# Patient Record
Sex: FEMALE | Race: WHITE | NOT HISPANIC OR LATINO
[De-identification: names, ages, dates, MRNs, and addresses within clinical notes are randomized per-mention and may not be internally consistent; named-entity substitution may affect disease eponyms.]

---

## 2022-04-07 ENCOUNTER — APPOINTMENT (OUTPATIENT)
Dept: ORTHOPEDIC SURGERY | Facility: CLINIC | Age: 47
End: 2022-04-07
Payer: COMMERCIAL

## 2022-04-07 VITALS — WEIGHT: 170 LBS | HEIGHT: 64 IN | BODY MASS INDEX: 29.02 KG/M2

## 2022-04-07 DIAGNOSIS — M25.511 PAIN IN RIGHT SHOULDER: ICD-10-CM

## 2022-04-07 DIAGNOSIS — M19.011 PRIMARY OSTEOARTHRITIS, RIGHT SHOULDER: ICD-10-CM

## 2022-04-07 DIAGNOSIS — Z86.79 PERSONAL HISTORY OF OTHER DISEASES OF THE CIRCULATORY SYSTEM: ICD-10-CM

## 2022-04-07 DIAGNOSIS — M75.51 BURSITIS OF RIGHT SHOULDER: ICD-10-CM

## 2022-04-07 PROBLEM — Z00.00 ENCOUNTER FOR PREVENTIVE HEALTH EXAMINATION: Status: ACTIVE | Noted: 2022-04-07

## 2022-04-07 PROCEDURE — 99213 OFFICE O/P EST LOW 20 MIN: CPT

## 2022-04-07 NOTE — PHYSICAL EXAM
[Right] : right shoulder [Sitting] : sitting [4 ___] : forward flexion 4[unfilled]/5 [4___] : internal rotation 4[unfilled]/5 [] : motor and sensory intact distally [TWNoteComboBox7] : active forward flexion 140 degrees [de-identified] : active abduction 85 degrees [TWNoteComboBox6] : internal rotation L5 [de-identified] : external rotation 85 degrees

## 2022-04-07 NOTE — HISTORY OF PRESENT ILLNESS
[Steroid] : Steroid [de-identified] : Injury Details: The location of the patients problem is right shoulder. The date of injury was 11/4/21 Details of accident / injury: patient had an injury while jet skiing she states she fell off the back of a jetski. Complaining of constant pain in the shoulder. Describes the pain as dull and aching she states the pain radiates down the arm. . The injury was sudden. On a scale of 0-10, the pain when active is 4. On a scale of 0-10, the pain when at rest is 4. The quality of the pain is: Dull/Aching The patient has not had surgery for this problem in the past. The patient has not had physical therapy for this problem in the past.. The patient has not had any test or imaging studies for this problem. [] : no [de-identified] : 03/10/2022 [de-identified] : Dr Samayoa [de-identified] : 03/10/2022 [de-identified] : xray  [de-identified] : cortisone injection  [de-identified] : 03/10/2022

## 2022-04-07 NOTE — DISCUSSION/SUMMARY
[de-identified] : reviewed findings, anatomy and pathology  discussed and pt understands. questions answered, pt left pleased\par  discuss apply ice to area -- 20 min on and 20 min off  continue home exercises\par After discussion of Dx & Treatment options was discuss , pt elected to Tx non operatively with exercise , medications, physical therapy and activity modification.\par

## 2022-05-26 ENCOUNTER — APPOINTMENT (OUTPATIENT)
Dept: ORTHOPEDIC SURGERY | Facility: CLINIC | Age: 47
End: 2022-05-26

## 2022-09-29 ENCOUNTER — NON-APPOINTMENT (OUTPATIENT)
Age: 47
End: 2022-09-29

## 2022-10-23 ENCOUNTER — NON-APPOINTMENT (OUTPATIENT)
Age: 47
End: 2022-10-23

## 2023-03-24 ENCOUNTER — NON-APPOINTMENT (OUTPATIENT)
Age: 48
End: 2023-03-24

## 2023-03-24 ENCOUNTER — APPOINTMENT (OUTPATIENT)
Dept: CARDIOLOGY | Facility: CLINIC | Age: 48
End: 2023-03-24
Payer: COMMERCIAL

## 2023-03-24 VITALS
DIASTOLIC BLOOD PRESSURE: 90 MMHG | WEIGHT: 186 LBS | BODY MASS INDEX: 31.76 KG/M2 | HEART RATE: 88 BPM | OXYGEN SATURATION: 99 % | SYSTOLIC BLOOD PRESSURE: 136 MMHG | HEIGHT: 64 IN

## 2023-03-24 VITALS — DIASTOLIC BLOOD PRESSURE: 90 MMHG | SYSTOLIC BLOOD PRESSURE: 136 MMHG

## 2023-03-24 DIAGNOSIS — Z78.9 OTHER SPECIFIED HEALTH STATUS: ICD-10-CM

## 2023-03-24 DIAGNOSIS — Z82.49 FAMILY HISTORY OF ISCHEMIC HEART DISEASE AND OTHER DISEASES OF THE CIRCULATORY SYSTEM: ICD-10-CM

## 2023-03-24 PROCEDURE — 99204 OFFICE O/P NEW MOD 45 MIN: CPT | Mod: 25

## 2023-03-24 PROCEDURE — 93000 ELECTROCARDIOGRAM COMPLETE: CPT

## 2023-03-24 RX ORDER — ROSUVASTATIN CALCIUM 20 MG/1
20 TABLET, FILM COATED ORAL
Refills: 0 | Status: ACTIVE | COMMUNITY

## 2023-03-24 RX ORDER — LISINOPRIL 10 MG/1
10 TABLET ORAL
Refills: 0 | Status: DISCONTINUED | COMMUNITY
End: 2023-03-24

## 2023-03-24 RX ORDER — LISINOPRIL 20 MG/1
20 TABLET ORAL DAILY
Qty: 90 | Refills: 3 | Status: ACTIVE | COMMUNITY
Start: 2023-03-24

## 2023-03-24 NOTE — CARDIOLOGY SUMMARY
[de-identified] : March 24, 2023.  Normal sinus rhythm low voltage left atrial abnormality poor R wave progression

## 2023-03-24 NOTE — PHYSICAL EXAM
[Well Developed] : well developed [Well Nourished] : well nourished [No Acute Distress] : no acute distress [Normal Venous Pressure] : normal venous pressure [No Carotid Bruit] : no carotid bruit [Normal S1, S2] : normal S1, S2 [No Murmur] : no murmur [No Rub] : no rub [No Gallop] : no gallop [Clear Lung Fields] : clear lung fields [Good Air Entry] : good air entry [No Respiratory Distress] : no respiratory distress  [Normal Gait] : normal gait [No Edema] : no edema [No Cyanosis] : no cyanosis [No Clubbing] : no clubbing [No Varicosities] : no varicosities [Normal Radial B/L] : normal radial B/L [Normal DP B/L] : normal DP B/L [Moves all extremities] : moves all extremities [Normal Speech] : normal speech [Alert and Oriented] : alert and oriented

## 2023-03-24 NOTE — DISCUSSION/SUMMARY
[FreeTextEntry1] : 47-year-old with above medical history active medical problems as noted below\par 1.  Abnormal EKG.  Nonspecific.  Poor R wave progression low voltage.  Rule out silent myocardial injury with echocardiogram.  Also evaluate for LV ejection fraction wall motion LV wall thickness.\par 2.  Essential hypertension.  Mildly uncontrolled.  Goal less than 130/80.  There is no CHF.  No CKD.  She is non-smoker.\par Low caffeine and alcohol intake reviewed\par Low-salt diet low-carb diet, regular aerobic exercises and weight reduction.\par Add hydrochlorothiazide to lisinopril 20 mg.  Risk benefits alternatives side effects reviewed with her.  Recheck basic metabolic panel in about 2 weeks\par Any side effects she will contact us\par As long as no significant side effects and blood pressure is better control combine lisinopril/hydrochlorothiazide to minimize the number of pills taken\par 3.  Dyslipidemia.  On statin therapy.  For further restratification if echocardiogram and exercise treadmill stress test does not reveal any significant structural heart disease or obstructive CAD consider coronary calcium score or coronary CTA.\par 4.  Overweight borderline for obesity.  Lifestyle modification discussed with weight reduction for long-term cardiovascular benefits\par \par Counseling regarding low saturated fat, salt and carbohydrate intake was reviewed. Active lifestyle and regular. Exercise along with weight management is advised.\par All the above were at length reviewed. Answered all the questions. Thank you very much for this kind referral. Please do not hesitate to give me a call for any question.\par Part of this transcription was done with voice recognition software and phonetically similar errors are common. I apologize for that. Please do not hesitate to call for any questions due to above.\par \par Sincerely,\par Mariangel Live MD,FACC,FASELIJAH\par

## 2023-03-24 NOTE — REASON FOR VISIT
[Hyperlipidemia] : hyperlipidemia [Hypertension] : hypertension [FreeTextEntry3] : Dr. Falcon [FreeTextEntry1] : 47-year-old white female is referred to me for consultation in presence of\par Essential hypertension without any history of CHF CKD.  Non-smoker.  Blood pressure at home remaining in 130s and 90s.\par Dyslipidemia on statin therapy tolerating it well.\par \par No history of myocardial infarction, coronary artery disease, cerebrovascular accident, peripheral artery disease, rheumatic fever, thyroid or Lyme disease\par \par At baseline no significant chest pain.  Exertional dyspnea without any PND orthopnea.  No palpitation dizziness near syncopal or syncopal event.  No visual disturbances focal weakness.  No nausea vomiting diarrhea dark-colored stool change in bowel habits.\par No claudication pain\par No hospital admission from cardiac point of view\par No snoring.  No daytime fatigue tiredness or sleepiness no early morning headache.

## 2023-03-24 NOTE — ASSESSMENT
[FreeTextEntry1] : Reviewed on March 24, 2023.\par EKG as noted above\par Labs from 8/2/2022 normal CBC.  Sodium 135 potassium 4.4 creatinine 0.48 hemoglobin A1c 5.2 HDL 67 triglycerides 95 LDL 90 total cholesterol 176 TSH 1.74

## 2023-04-29 ENCOUNTER — NON-APPOINTMENT (OUTPATIENT)
Age: 48
End: 2023-04-29

## 2023-05-01 NOTE — PHYSICAL EXAM
[Well Developed] : well developed [Well Nourished] : well nourished [No Acute Distress] : no acute distress [Normal Venous Pressure] : normal venous pressure [No Carotid Bruit] : no carotid bruit [Normal S1, S2] : normal S1, S2 [No Murmur] : no murmur [No Gallop] : no gallop [No Rub] : no rub [Clear Lung Fields] : clear lung fields [Good Air Entry] : good air entry [No Respiratory Distress] : no respiratory distress  [Normal Gait] : normal gait [No Edema] : no edema [No Cyanosis] : no cyanosis [No Clubbing] : no clubbing [No Varicosities] : no varicosities [Normal Radial B/L] : normal radial B/L [Normal DP B/L] : normal DP B/L [Moves all extremities] : moves all extremities [Normal Speech] : normal speech [Alert and Oriented] : alert and oriented

## 2023-05-02 ENCOUNTER — APPOINTMENT (OUTPATIENT)
Dept: CARDIOLOGY | Facility: CLINIC | Age: 48
End: 2023-05-02
Payer: COMMERCIAL

## 2023-05-02 VITALS
SYSTOLIC BLOOD PRESSURE: 138 MMHG | WEIGHT: 186 LBS | BODY MASS INDEX: 31.76 KG/M2 | HEART RATE: 92 BPM | DIASTOLIC BLOOD PRESSURE: 80 MMHG | OXYGEN SATURATION: 95 % | HEIGHT: 64 IN

## 2023-05-02 DIAGNOSIS — Z87.891 PERSONAL HISTORY OF NICOTINE DEPENDENCE: ICD-10-CM

## 2023-05-02 DIAGNOSIS — Z13.6 ENCOUNTER FOR SCREENING FOR CARDIOVASCULAR DISORDERS: ICD-10-CM

## 2023-05-02 PROCEDURE — 93015 CV STRESS TEST SUPVJ I&R: CPT

## 2023-05-02 PROCEDURE — 93306 TTE W/DOPPLER COMPLETE: CPT

## 2023-05-02 PROCEDURE — 99214 OFFICE O/P EST MOD 30 MIN: CPT

## 2023-05-02 NOTE — REASON FOR VISIT
[Hyperlipidemia] : hyperlipidemia [Hypertension] : hypertension [FreeTextEntry3] : Dr. Falcon [FreeTextEntry1] : 47-year-old white female is referred to me for consultation in presence of\par Essential hypertension without any history of CHF CKD.  Non-smoker.  Blood pressure at home remaining in 130s and 90s.\par Dyslipidemia on statin therapy tolerating it well.\par \par No history of myocardial infarction, coronary artery disease, cerebrovascular accident, peripheral artery disease, rheumatic fever, thyroid or Lyme disease\par \par At baseline no significant chest pain.  Exertional dyspnea without any PND orthopnea.  No palpitation dizziness near syncopal or syncopal event.  No visual disturbances focal weakness.  No nausea vomiting diarrhea dark-colored stool change in bowel habits.\par No claudication pain\par No hospital admission from cardiac point of view\par No snoring.  No daytime fatigue tiredness or sleepiness no early morning headache.\par \par Reviewed her echocardiogram and exercise treadmill stress test.\par She is tolerating her hydrochlorothiazide well.\par Did not get the labs

## 2023-05-02 NOTE — DISCUSSION/SUMMARY
[FreeTextEntry1] : 47-year-old with above medical history active medical problems as noted below\par 1.  Abnormal EKG.  Nonspecific.  Poor R wave progression low voltage.  Preserved EF no significant wall motion abnormality.  No significant signs of scarring.\par 2.  Essential hypertension.  Much better controlled.  Still borderline..  Goal less than 130/80.  There is no CHF.  No CKD.  She is non-smoker.\par Low caffeine and alcohol intake reviewed\par Low-salt diet low-carb diet, regular aerobic exercises and weight reduction.\par Continue with lisinopril 20 mg hydrochlorothiazide 12.5 mg\par Risk benefits alternatives side effects reviewed with her.  Recheck basic metabolic panel prescription given.\par Lifestyle modifications reviewed.\par In 3 months if blood pressure remains greater than 130/80 would recommend to change lisinopril to angiotensin receptor blocker.\par 3.  Dyslipidemia.  On statin therapy.  Reviewed with her regarding further restratification with coronary calcium score.  Risk benefits alternatives of radiation/CT scan discussed with her.  She understands and agrees.\par She also understands out-of-pocket cost.\par 4.  Overweight borderline for obesity.  Lifestyle modification discussed with weight reduction for long-term cardiovascular benefits\par \par Counseling regarding low saturated fat, salt and carbohydrate intake was reviewed. Active lifestyle and regular. Exercise along with weight management is advised.\par All the above were at length reviewed. Answered all the questions. Thank you very much for this kind referral. Please do not hesitate to give me a call for any question.\par Part of this transcription was done with voice recognition software and phonetically similar errors are common. I apologize for that. Please do not hesitate to call for any questions due to above.\par \par Sincerely,\par Mariangel Live MD,FACC,MELCHOR\par

## 2023-05-02 NOTE — CARDIOLOGY SUMMARY
[de-identified] : March 24, 2023.  Normal sinus rhythm low voltage left atrial abnormality poor R wave progression [de-identified] : Exercise treadmill stress test.  May 2, 2023.  Nonischemic.  8 METS [de-identified] : Echocardiogram.  May 2, 2023.  Preserved EF.

## 2023-05-16 ENCOUNTER — TRANSCRIPTION ENCOUNTER (OUTPATIENT)
Age: 48
End: 2023-05-16

## 2023-05-18 ENCOUNTER — NON-APPOINTMENT (OUTPATIENT)
Age: 48
End: 2023-05-18

## 2023-05-22 ENCOUNTER — NON-APPOINTMENT (OUTPATIENT)
Age: 48
End: 2023-05-22

## 2023-08-15 ENCOUNTER — APPOINTMENT (OUTPATIENT)
Dept: CARDIOLOGY | Facility: CLINIC | Age: 48
End: 2023-08-15
Payer: COMMERCIAL

## 2023-08-15 VITALS
HEART RATE: 82 BPM | BODY MASS INDEX: 31.41 KG/M2 | WEIGHT: 184 LBS | SYSTOLIC BLOOD PRESSURE: 128 MMHG | HEIGHT: 64 IN | DIASTOLIC BLOOD PRESSURE: 72 MMHG | OXYGEN SATURATION: 96 %

## 2023-08-15 DIAGNOSIS — I10 ESSENTIAL (PRIMARY) HYPERTENSION: ICD-10-CM

## 2023-08-15 DIAGNOSIS — E78.5 HYPERLIPIDEMIA, UNSPECIFIED: ICD-10-CM

## 2023-08-15 DIAGNOSIS — R94.31 ABNORMAL ELECTROCARDIOGRAM [ECG] [EKG]: ICD-10-CM

## 2023-08-15 DIAGNOSIS — E66.3 OVERWEIGHT: ICD-10-CM

## 2023-08-15 PROCEDURE — 99214 OFFICE O/P EST MOD 30 MIN: CPT

## 2023-08-15 NOTE — ASSESSMENT
[FreeTextEntry1] : Reviewed on March 24, 2023. EKG as noted above Labs from 8/2/2022 normal CBC.  Sodium 135 potassium 4.4 creatinine 0.48 hemoglobin A1c 5.2 HDL 67 triglycerides 95 LDL 90 total cholesterol 176 TSH 1.74   reviewed on August 15, 2023 Coronary calcium score reviewed.

## 2023-08-15 NOTE — CARDIOLOGY SUMMARY
[de-identified] : March 24, 2023.  Normal sinus rhythm low voltage left atrial abnormality poor R wave progression [de-identified] : Exercise treadmill stress test.  May 2, 2023.  Nonischemic.  8 METS [de-identified] : Echocardiogram.  May 2, 2023.  Preserved EF. [de-identified] : Coronary calcium score.  May 2023.  Calcium score 0.

## 2023-08-15 NOTE — DISCUSSION/SUMMARY
[FreeTextEntry1] : 47-year-old with above medical history active medical problems as noted below 1.  Abnormal EKG.  Nonspecific.  Poor R wave progression low voltage.  Preserved EF no significant wall motion abnormality.  No significant signs of scarring.  Nonischemic stress test.  Coronary calcium score is 0.  Limitation of test reviewed Continue lifestyle and risk factor modifications reviewed. 2.  Essential hypertension.  Much better controlled.    Goal less than 130/80.  There is no CHF.  No CKD.  She is non-smoker. Low caffeine and alcohol intake reviewed Low-salt diet low-carb diet, regular aerobic exercises and weight reduction. Continue with lisinopril 20 mg hydrochlorothiazide 12.5 mg Risk benefits alternatives side effects reviewed with her.  Recheck basic metabolic panel prescription given. Lifestyle modifications reviewed. 3.  Dyslipidemia.  On statin therapy.  Reviewed with her regarding further restratification with coronary calcium score.  Risk benefits alternatives of radiation/CT scan discussed with her.  She understands and agrees. 4.  Overweight borderline for obesity.  Lifestyle modification discussed with weight reduction for long-term cardiovascular benefits  Counseling regarding low saturated fat, salt and carbohydrate intake was reviewed. Active lifestyle and regular. Exercise along with weight management is advised. All the above were at length reviewed. Answered all the questions. Thank you very much for this kind referral. Please do not hesitate to give me a call for any question. Part of this transcription was done with voice recognition software and phonetically similar errors are common. I apologize for that. Please do not hesitate to call for any questions due to above.  Sincerely, Mariangel Live MD,[facility],MELCHOR

## 2023-08-15 NOTE — REASON FOR VISIT
[Hyperlipidemia] : hyperlipidemia [Hypertension] : hypertension [FreeTextEntry3] : Dr. Falcon [FreeTextEntry1] : 47-year-old was seen in the office for follow-up consultation to review her cardiovascular test specifically coronary calcium score, echocardiogram, stress test. Essential hypertension without any history of CHF CKD.  Non-smoker.  Blood pressure at home remaining in 130s and 90s. Dyslipidemia on statin therapy tolerating it well.  No history of myocardial infarction, coronary artery disease, cerebrovascular accident, peripheral artery disease, rheumatic fever, thyroid or Lyme disease  At baseline no significant chest pain.  Exertional dyspnea without any PND orthopnea.  No palpitation dizziness near syncopal or syncopal event.  No visual disturbances focal weakness.  No nausea vomiting diarrhea dark-colored stool change in bowel habits. No claudication pain No hospital admission from cardiac point of view No snoring.  No daytime fatigue tiredness or sleepiness no early morning headache.

## 2023-09-20 ENCOUNTER — RX RENEWAL (OUTPATIENT)
Age: 48
End: 2023-09-20

## 2023-10-29 ENCOUNTER — NON-APPOINTMENT (OUTPATIENT)
Age: 48
End: 2023-10-29

## 2023-12-20 ENCOUNTER — RX RENEWAL (OUTPATIENT)
Age: 48
End: 2023-12-20

## 2024-03-21 RX ORDER — HYDROCHLOROTHIAZIDE 12.5 MG/1
12.5 TABLET ORAL
Qty: 90 | Refills: 3 | Status: ACTIVE | COMMUNITY
Start: 2023-03-24 | End: 1900-01-01

## 2024-08-19 ENCOUNTER — APPOINTMENT (OUTPATIENT)
Dept: ORTHOPEDIC SURGERY | Facility: CLINIC | Age: 49
End: 2024-08-19

## 2024-08-19 DIAGNOSIS — M25.511 PAIN IN RIGHT SHOULDER: ICD-10-CM

## 2024-08-19 DIAGNOSIS — M75.51 BURSITIS OF RIGHT SHOULDER: ICD-10-CM

## 2024-08-19 DIAGNOSIS — M19.019 PRIMARY OSTEOARTHRITIS, UNSPECIFIED SHOULDER: ICD-10-CM

## 2024-08-19 PROCEDURE — 99213 OFFICE O/P EST LOW 20 MIN: CPT | Mod: 1L

## 2024-08-19 PROCEDURE — 20610 DRAIN/INJ JOINT/BURSA W/O US: CPT | Mod: RT

## 2024-08-19 PROCEDURE — 73030 X-RAY EXAM OF SHOULDER: CPT | Mod: RT

## 2024-08-19 NOTE — HISTORY OF PRESENT ILLNESS
[de-identified] : Patient states that she had an injury to her RT shoulder on 8/17/24. Patient was treated for bursitis in 2022 and was fine until that new injury. Patient states that it feels inflamed. Patient has been resting her arm. Patient is taking Tylenol prn.

## 2024-08-19 NOTE — PHYSICAL EXAM
[Sitting] : sitting [Moderate] : moderate [4 ___] : forward flexion 4[unfilled]/5 [4___] : internal rotation 4[unfilled]/5 [] : no erythema [Right] : right shoulder [There are no fractures, subluxations or dislocations. No significant abnormalities are seen] : There are no fractures, subluxations or dislocations. No significant abnormalities are seen [Degenerative change] : Degenerative change [AC Joint Arthrosis] : AC Joint Arthrosis [TWNoteComboBox7] : active forward flexion 90 degrees [de-identified] : active abduction 70 degrees [TWNoteComboBox6] : internal rotation sacrum [de-identified] : external rotation 70 degrees

## 2024-08-19 NOTE — PROCEDURE
[Large Joint Injection] : Large joint injection [Right] : of the right [Shoulder] : shoulder [Pain] : pain [Inflammation] : inflammation [Alcohol] : alcohol [Betadine] : betadine [Ethyl Chloride sprayed topically] : ethyl chloride sprayed topically [Sterile technique used] : sterile technique used [___ cc    6mg] :  Betamethasone (Celestone) ~Vcc of 6mg [___ cc    1%] : Lidocaine ~Vcc of 1%  [] : Patient tolerated procedure well [Call if redness, pain or fever occur] : call if redness, pain or fever occur [Apply ice for 15min out of every hour for the next 12-24 hours as tolerated] : apply ice for 15 minutes out of every hour for the next 12-24 hours as tolerated [Patient was advised to rest the joint(s) for ____ days] : patient was advised to rest the joint(s) for [unfilled] days [Previous OTC use and PT nontherapeutic] : patient has tried OTC's including aspirin, Ibuprofen, Aleve, etc or prescription NSAIDS, and/or exercises at home and/or physical therapy without satisfactory response [Patient had decreased mobility in the joint] : patient had decreased mobility in the joint [Risks, benefits, alternatives discussed / Verbal consent obtained] : the risks benefits, and alternatives have been discussed, and verbal consent was obtained

## 2024-08-19 NOTE — DISCUSSION/SUMMARY
[de-identified] : I reviewed all diagnostic and physical findings, the anatomy and pathology were discussed and the patient understands. All questions were answered and the patient left pleased. After discussion of diagnosis and treatment options, this patient has elected to treat non-operatively with exercises, medications, physical therapy and activity modification.

## 2024-09-03 ENCOUNTER — APPOINTMENT (OUTPATIENT)
Dept: ORTHOPEDIC SURGERY | Facility: CLINIC | Age: 49
End: 2024-09-03

## 2024-09-24 ENCOUNTER — NON-APPOINTMENT (OUTPATIENT)
Age: 49
End: 2024-09-24

## 2024-09-24 ENCOUNTER — APPOINTMENT (OUTPATIENT)
Dept: CARDIOLOGY | Facility: CLINIC | Age: 49
End: 2024-09-24
Payer: COMMERCIAL

## 2024-09-24 VITALS
HEIGHT: 64 IN | OXYGEN SATURATION: 96 % | WEIGHT: 188 LBS | SYSTOLIC BLOOD PRESSURE: 120 MMHG | DIASTOLIC BLOOD PRESSURE: 70 MMHG | BODY MASS INDEX: 32.1 KG/M2 | HEART RATE: 97 BPM

## 2024-09-24 DIAGNOSIS — R94.31 ABNORMAL ELECTROCARDIOGRAM [ECG] [EKG]: ICD-10-CM

## 2024-09-24 DIAGNOSIS — Z13.6 ENCOUNTER FOR SCREENING FOR CARDIOVASCULAR DISORDERS: ICD-10-CM

## 2024-09-24 DIAGNOSIS — I10 ESSENTIAL (PRIMARY) HYPERTENSION: ICD-10-CM

## 2024-09-24 DIAGNOSIS — E78.5 HYPERLIPIDEMIA, UNSPECIFIED: ICD-10-CM

## 2024-09-24 PROCEDURE — 93000 ELECTROCARDIOGRAM COMPLETE: CPT

## 2024-09-24 PROCEDURE — G2211 COMPLEX E/M VISIT ADD ON: CPT | Mod: NC

## 2024-09-24 PROCEDURE — 99214 OFFICE O/P EST MOD 30 MIN: CPT

## 2024-09-24 NOTE — REASON FOR VISIT
[Hyperlipidemia] : hyperlipidemia [Hypertension] : hypertension [FreeTextEntry3] : Dr. Falcon [FreeTextEntry1] : 48-year-old was seen in the office for follow-up consultation for management of Essential hypertension without any history of CHF CKD.  Non-smoker.  Blood pressure at home remaining in 130s and 90s. Dyslipidemia on statin therapy tolerating it well.  No history of myocardial infarction, coronary artery disease, cerebrovascular accident, peripheral artery disease, rheumatic fever, thyroid or Lyme disease  At baseline no significant chest pain.  Exertional dyspnea without any PND orthopnea.  No palpitation dizziness near syncopal or syncopal event.  No visual disturbances focal weakness.  No nausea vomiting diarrhea dark-colored stool change in bowel habits. She has been using her [employer] bike 15 to 20 miles a day. No claudication pain No hospital admission from cardiac point of view No snoring.  No daytime fatigue tiredness or sleepiness no early morning headache.

## 2024-09-24 NOTE — ASSESSMENT
[FreeTextEntry1] : Reviewed on March 24, 2023. EKG as noted above Labs from 8/2/2022 normal CBC.  Sodium 135 potassium 4.4 creatinine 0.48 hemoglobin A1c 5.2 HDL 67 triglycerides 95 LDL 90 total cholesterol 176 TSH 1.74   reviewed on August 15, 2023 Coronary calcium score reviewed.  Reviewed on September 24, 2024.  EKG from today reviewed.  Labs done recently are requested.

## 2024-09-24 NOTE — CARDIOLOGY SUMMARY
[de-identified] : March 24, 2023.  Normal sinus rhythm low voltage left atrial abnormality poor R wave progression September 24, 2024.  Normal sinus rhythm.  Left atrial abnormality.  Poor R wave progression. [de-identified] : Exercise treadmill stress test.  May 2, 2023.  Nonischemic.  8 METS [de-identified] : Echocardiogram.  May 2, 2023.  Preserved EF. [de-identified] : Coronary calcium score.  May 2023.  Calcium score 0.

## 2024-09-24 NOTE — DISCUSSION/SUMMARY
[FreeTextEntry1] : 48-year-old with above medical history active medical problems as noted below 1.  Abnormal EKG.  Nonspecific.  Poor R wave progression low voltage.  Preserved EF no significant wall motion abnormality.  No significant signs of scarring.  Nonischemic stress test.  Coronary calcium score is 0.  Limitation of test reviewed Continue lifestyle and risk factor modifications reviewed. 2.  Essential hypertension.  Much better controlled.    Goal less than 130/80.  There is no CHF.  No CKD.  She is non-smoker. Low caffeine and alcohol intake reviewed Low-salt diet low-carb diet, regular aerobic exercises and weight reduction. Continue with lisinopril 20 mg hydrochlorothiazide 12.5 mg Risk benefits alternatives side effects reviewed with her.  Recheck basic metabolic panel prescription given. Lifestyle modifications reviewed. 3.  Dyslipidemia.  On statin therapy.  Reviewed with her regarding further restratification with coronary calcium score.  Risk benefits alternatives of radiation/CT scan discussed with her.  She understands and agrees. 4.  Overweight borderline for obesity.  Lifestyle modification discussed with weight reduction for long-term cardiovascular benefits  Counseling regarding low saturated fat, salt and carbohydrate intake was reviewed. Active lifestyle and regular. Exercise along with weight management is advised. All the above were at length reviewed. Answered all the questions. Thank you very much for this kind referral. Please do not hesitate to give me a call for any question. Part of this transcription was done with voice recognition software and phonetically similar errors are common. I apologize for that. Please do not hesitate to call for any questions due to above.  Sincerely, Mariangel Live MD,[facility],MELCHOR  [EKG obtained to assist in diagnosis and management of assessed problem(s)] : EKG obtained to assist in diagnosis and management of assessed problem(s)

## 2024-11-13 NOTE — PHYSICAL EXAM
[Well Developed] : well developed [No Acute Distress] : no acute distress [Normal Venous Pressure] : normal venous pressure [No Carotid Bruit] : no carotid bruit [Normal S1, S2] : normal S1, S2 [No Rub] : no rub [Clear Lung Fields] : clear lung fields [Normal Gait] : normal gait [No Edema] : no edema [Normal Radial B/L] : normal radial B/L [Normal DP B/L] : normal DP B/L [Normal Speech] : normal speech [Alert and Oriented] : alert and oriented <-- Click to add NO pertinent Family History

## 2025-01-02 ENCOUNTER — NON-APPOINTMENT (OUTPATIENT)
Age: 50
End: 2025-01-02

## 2025-03-31 ENCOUNTER — APPOINTMENT (OUTPATIENT)
Dept: ORTHOPEDIC SURGERY | Facility: CLINIC | Age: 50
End: 2025-03-31
Payer: COMMERCIAL

## 2025-03-31 VITALS — WEIGHT: 180 LBS | BODY MASS INDEX: 30.73 KG/M2 | HEIGHT: 64 IN

## 2025-03-31 DIAGNOSIS — M47.24 OTHER SPONDYLOSIS WITH RADICULOPATHY, THORACIC REGION: ICD-10-CM

## 2025-03-31 PROCEDURE — 72070 X-RAY EXAM THORAC SPINE 2VWS: CPT

## 2025-03-31 PROCEDURE — 99213 OFFICE O/P EST LOW 20 MIN: CPT | Mod: 25

## 2025-03-31 RX ORDER — METHYLPREDNISOLONE 4 MG/1
4 TABLET ORAL
Qty: 1 | Refills: 0 | Status: ACTIVE | COMMUNITY
Start: 2025-03-31 | End: 1900-01-01

## 2025-03-31 RX ORDER — METHOCARBAMOL 750 MG/1
750 TABLET, FILM COATED ORAL
Qty: 60 | Refills: 0 | Status: ACTIVE | COMMUNITY
Start: 2025-03-31 | End: 1900-01-01

## 2025-03-31 RX ORDER — ROSUVASTATIN CALCIUM 10 MG/1
10 TABLET, FILM COATED ORAL
Refills: 0 | Status: ACTIVE | COMMUNITY

## 2025-04-12 ENCOUNTER — NON-APPOINTMENT (OUTPATIENT)
Age: 50
End: 2025-04-12

## 2025-04-18 LAB — HBA1C MFR BLD HPLC: 5.6

## 2025-05-29 ENCOUNTER — APPOINTMENT (OUTPATIENT)
Dept: ORTHOPEDIC SURGERY | Facility: CLINIC | Age: 50
End: 2025-05-29

## 2025-05-29 DIAGNOSIS — M19.079 PRIMARY OSTEOARTHRITIS, UNSPECIFIED ANKLE AND FOOT: ICD-10-CM

## 2025-05-29 DIAGNOSIS — M65.969 UNSP SYNOVITIS AND TENOSYNOVITIS, UNSPECIFIED LOWER LEG: ICD-10-CM

## 2025-05-29 PROCEDURE — 99214 OFFICE O/P EST MOD 30 MIN: CPT | Mod: 25

## 2025-05-29 PROCEDURE — 73630 X-RAY EXAM OF FOOT: CPT | Mod: RT

## 2025-06-02 ENCOUNTER — APPOINTMENT (OUTPATIENT)
Dept: ORTHOPEDIC SURGERY | Facility: CLINIC | Age: 50
End: 2025-06-02

## 2025-06-02 DIAGNOSIS — M51.34 OTHER INTERVERTEBRAL DISC DEGENERATION, THORACIC REGION: ICD-10-CM

## 2025-06-02 DIAGNOSIS — M51.24 OTHER INTERVERTEBRAL DISC DISPLACEMENT, THORACIC REGION: ICD-10-CM

## 2025-06-02 PROCEDURE — 99214 OFFICE O/P EST MOD 30 MIN: CPT

## 2025-06-02 RX ORDER — METHOCARBAMOL 750 MG/1
750 TABLET, FILM COATED ORAL
Qty: 60 | Refills: 1 | Status: ACTIVE | COMMUNITY
Start: 2025-06-02 | End: 1900-01-01

## 2025-07-14 ENCOUNTER — APPOINTMENT (OUTPATIENT)
Dept: ORTHOPEDIC SURGERY | Facility: CLINIC | Age: 50
End: 2025-07-14
Payer: COMMERCIAL

## 2025-07-14 PROCEDURE — 99213 OFFICE O/P EST LOW 20 MIN: CPT

## 2025-08-11 ENCOUNTER — APPOINTMENT (OUTPATIENT)
Dept: ORTHOPEDIC SURGERY | Facility: CLINIC | Age: 50
End: 2025-08-11
Payer: COMMERCIAL

## 2025-08-11 DIAGNOSIS — M51.24 OTHER INTERVERTEBRAL DISC DISPLACEMENT, THORACIC REGION: ICD-10-CM

## 2025-08-11 DIAGNOSIS — M51.34 OTHER INTERVERTEBRAL DISC DEGENERATION, THORACIC REGION: ICD-10-CM

## 2025-08-11 DIAGNOSIS — M47.24 OTHER SPONDYLOSIS WITH RADICULOPATHY, THORACIC REGION: ICD-10-CM

## 2025-08-11 PROCEDURE — 99213 OFFICE O/P EST LOW 20 MIN: CPT

## 2025-09-11 ENCOUNTER — APPOINTMENT (OUTPATIENT)
Dept: ORTHOPEDIC SURGERY | Facility: CLINIC | Age: 50
End: 2025-09-11
Payer: COMMERCIAL

## 2025-09-11 DIAGNOSIS — M51.24 OTHER INTERVERTEBRAL DISC DISPLACEMENT, THORACIC REGION: ICD-10-CM

## 2025-09-11 DIAGNOSIS — M51.34 OTHER INTERVERTEBRAL DISC DEGENERATION, THORACIC REGION: ICD-10-CM

## 2025-09-11 DIAGNOSIS — M47.24 OTHER SPONDYLOSIS WITH RADICULOPATHY, THORACIC REGION: ICD-10-CM

## 2025-09-11 PROCEDURE — 99214 OFFICE O/P EST MOD 30 MIN: CPT

## 2025-09-11 RX ORDER — MELOXICAM 15 MG/1
15 TABLET ORAL
Qty: 30 | Refills: 1 | Status: ACTIVE | COMMUNITY
Start: 2025-09-11 | End: 1900-01-01